# Patient Record
Sex: MALE | Race: WHITE | URBAN - METROPOLITAN AREA
[De-identification: names, ages, dates, MRNs, and addresses within clinical notes are randomized per-mention and may not be internally consistent; named-entity substitution may affect disease eponyms.]

---

## 2019-12-11 LAB
INFLUENZA A ANTIGEN, POC: NEGATIVE
INFLUENZA B ANTIGEN, POC: NEGATIVE

## 2019-12-14 ENCOUNTER — HISTORICAL (OUTPATIENT)
Dept: LAB | Facility: HOSPITAL | Age: 39
End: 2019-12-14

## 2019-12-14 LAB
ABS NEUT (OLG): 10.41 X10(3)/MCL (ref 2.1–9.2)
ALBUMIN SERPL-MCNC: 3.4 GM/DL (ref 3.4–5)
ALBUMIN/GLOB SERPL: 0.7 RATIO (ref 1.1–2)
ALP SERPL-CCNC: 68 UNIT/L (ref 46–116)
ALT SERPL-CCNC: 22 UNIT/L (ref 12–78)
AST SERPL-CCNC: 21 UNIT/L (ref 15–37)
BASOPHILS # BLD AUTO: 0 X10(3)/MCL (ref 0–0.2)
BASOPHILS NFR BLD AUTO: 0 %
BILIRUB SERPL-MCNC: 1.2 MG/DL (ref 0.2–1)
BILIRUBIN DIRECT+TOT PNL SERPL-MCNC: 0.34 MG/DL (ref 0–0.2)
BILIRUBIN DIRECT+TOT PNL SERPL-MCNC: 0.86 MG/DL (ref 0–0.8)
BUN SERPL-MCNC: 16.5 MG/DL (ref 7–18)
CALCIUM SERPL-MCNC: 9.5 MG/DL (ref 8.5–10.1)
CHLORIDE SERPL-SCNC: 100 MMOL/L (ref 98–107)
CO2 SERPL-SCNC: 27.7 MMOL/L (ref 21–32)
CREAT SERPL-MCNC: 1.23 MG/DL (ref 0.6–1.3)
EOSINOPHIL # BLD AUTO: 0 X10(3)/MCL (ref 0–0.9)
EOSINOPHIL NFR BLD AUTO: 0 %
ERYTHROCYTE [DISTWIDTH] IN BLOOD BY AUTOMATED COUNT: 12.9 % (ref 11.5–17)
FLUAV AG NPH QL IA: NEGATIVE
FLUBV AG NPH QL IA: NEGATIVE
GLOBULIN SER-MCNC: 5 GM/DL (ref 2.4–3.5)
GLUCOSE SERPL-MCNC: 137 MG/DL (ref 74–106)
HCT VFR BLD AUTO: 44.3 % (ref 42–52)
HGB BLD-MCNC: 14.8 GM/DL (ref 14–18)
IMM GRANULOCYTES # BLD AUTO: 0.02 % (ref 0–0.02)
IMM GRANULOCYTES NFR BLD AUTO: 0.2 % (ref 0–0.43)
LYMPHOCYTES # BLD AUTO: 0.7 X10(3)/MCL (ref 0.6–4.6)
LYMPHOCYTES NFR BLD AUTO: 6 %
MCH RBC QN AUTO: 28.7 PG (ref 27–31)
MCHC RBC AUTO-ENTMCNC: 33.4 GM/DL (ref 33–36)
MCV RBC AUTO: 85.9 FL (ref 80–94)
MONOCYTES # BLD AUTO: 1.3 X10(3)/MCL (ref 0.1–1.3)
MONOCYTES NFR BLD AUTO: 10 %
NEUTROPHILS # BLD AUTO: 10.41 X10(3)/MCL (ref 1.4–7.9)
NEUTROPHILS NFR BLD AUTO: 83 %
PLATELET # BLD AUTO: 314 X10(3)/MCL (ref 130–400)
PMV BLD AUTO: 10 FL (ref 9.4–12.4)
POTASSIUM SERPL-SCNC: 3.7 MMOL/L (ref 3.5–5.1)
PROT SERPL-MCNC: 8.4 GM/DL (ref 6.4–8.2)
RBC # BLD AUTO: 5.16 X10(6)/MCL (ref 4.7–6.1)
SODIUM SERPL-SCNC: 137 MMOL/L (ref 136–145)
WBC # SPEC AUTO: 12.5 X10(3)/MCL (ref 4.5–11.5)

## 2021-03-29 LAB
ABS NEUT (OLG): 3.12 X10(3)/MCL (ref 2.1–9.2)
BUN SERPL-MCNC: 19.5 MG/DL (ref 8.9–20.6)
CALCIUM SERPL-MCNC: 9.2 MG/DL (ref 8.4–10.2)
CHLORIDE SERPL-SCNC: 108 MMOL/L (ref 98–107)
CO2 SERPL-SCNC: 26 MMOL/L (ref 22–29)
CREAT SERPL-MCNC: 1.02 MG/DL (ref 0.72–1.25)
CREAT/UREA NIT SERPL: 19
ERYTHROCYTE [DISTWIDTH] IN BLOOD BY AUTOMATED COUNT: 13.7 % (ref 11.5–17)
GLUCOSE SERPL-MCNC: 99 MG/DL (ref 74–100)
HCT VFR BLD AUTO: 44.6 % (ref 42–52)
HGB BLD-MCNC: 14.7 GM/DL (ref 14–18)
MCH RBC QN AUTO: 29 PG (ref 27–31)
MCHC RBC AUTO-ENTMCNC: 33 GM/DL (ref 33–36)
MCV RBC AUTO: 88 FL (ref 80–94)
NRBC BLD AUTO-RTO: 0 % (ref 0–0.2)
PLATELET # BLD AUTO: 264 X10(3)/MCL (ref 130–400)
PMV BLD AUTO: 10 FL (ref 7.4–10.4)
POTASSIUM SERPL-SCNC: 4.2 MMOL/L (ref 3.5–5.1)
RBC # BLD AUTO: 5.07 X10(6)/MCL (ref 4.7–6.1)
SARS-COV-2 AG RESP QL IA.RAPID: NEGATIVE
SODIUM SERPL-SCNC: 140 MMOL/L (ref 136–145)
WBC # SPEC AUTO: 5.3 X10(3)/MCL (ref 4.5–11.5)

## 2021-04-01 ENCOUNTER — HISTORICAL (OUTPATIENT)
Dept: SURGERY | Facility: HOSPITAL | Age: 41
End: 2021-04-01

## 2022-04-10 ENCOUNTER — HISTORICAL (OUTPATIENT)
Dept: ADMINISTRATIVE | Facility: HOSPITAL | Age: 42
End: 2022-04-10

## 2022-04-29 VITALS
HEIGHT: 70 IN | WEIGHT: 239 LBS | SYSTOLIC BLOOD PRESSURE: 131 MMHG | DIASTOLIC BLOOD PRESSURE: 90 MMHG | OXYGEN SATURATION: 97 % | BODY MASS INDEX: 34.22 KG/M2

## 2022-04-30 NOTE — OP NOTE
DATE OF SURGERY:    04/01/2021    SURGEON:  Kj Tavarez MD    FIRST ASSISTANT:  Gerardo Rockwell, certified physician assistant, who was necessary and essential for all aspects of the operation including patient positioning, surgical exposure, tendon repair, wound closure, and splint application.    COUNTS:  Lap, needle, and sponge counts were correct.    COMPLICATIONS:  None.    BLOOD LOSS:  None.    PREOPERATIVE DIAGNOSIS:  Ruptured right Achilles tendon.    POSTOPERATIVE DIAGNOSIS:  Ruptured right Achilles tendon.    PROCEDURE:  Repair of right Achilles tendon rupture.    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  He was diagnosed with a complete Achilles tendon rupture of his right lower extremity.  Risks, benefits, alternatives, and complications of operative and nonoperative treatment were explained.  He understood, agreed, and wanted to proceed with operative intervention.  Valid informed consent was obtained.    DESCRIPTION OF PROCEDURE:  He was brought to the operating room, placed supine on operating room table, and underwent general anesthesia.  A tourniquet was applied to the preoperatively marked and confirmed right lower extremity.  He was then positioned and secured in the prone position.  The usual sterile ChloraPrep scrub and paint followed by sterile draping was performed.  Ioban dressing was placed.  Esmarch bandage was used to exsanguinate the right lower extremity.  Tourniquet was inflated.  A posteromedial incision was made along the medial border of the Achilles tendon.  The hematoma was evacuated.  The zone of injury of the ruptured tendon extended from 2 cm proximal to the insertion onto the calcaneus all the way to the musculotendinous junction for a total zone of rupture of 8 cm.  The tendon strands proximally were identified along with those distally.  A 4-strand locking suture repair was  performed and there was an excellent repair of the tendon with reapposition of the proximal and distal ends of the tendon rupture.  I then used a 2-0 Vicryl suture to further augment the suture repair.  The 4-strand repair was with #1 Ethibond sutures.  The tourniquet was deflated.  Hemostasis was obtained.  There was no abnormal bleeding.  The wound was irrigated, suctioned, and then injected for postoperative pain control.  The wound was then closed with Vicryl suture to reapproximate skin edges and then Monocryl suture for skin closure along with application of Steri-Strips followed by placement of a sterile dressing and a well-padded posterior splint in slight plantar flexion to take tension off the repair.  Prior to closure, I could dorsiflex the ankle over 10 degrees and plantar flex to 25 degrees without any tension on the repair, and there was no gapping at the repair site.  The patient was taken to recovery room in stable condition.        ______________________________  MD WILLIAM Prince/UA  DD:  04/01/2021  Time:  09:09AM  DT:  04/01/2021  Time:  09:24AM  Job #:  [id number]

## 2022-09-15 ENCOUNTER — HISTORICAL (OUTPATIENT)
Dept: ADMINISTRATIVE | Facility: HOSPITAL | Age: 42
End: 2022-09-15